# Patient Record
Sex: MALE
[De-identification: names, ages, dates, MRNs, and addresses within clinical notes are randomized per-mention and may not be internally consistent; named-entity substitution may affect disease eponyms.]

---

## 2022-07-28 ENCOUNTER — HOSPITAL ENCOUNTER (EMERGENCY)
Dept: HOSPITAL 5 - ED | Age: 37
LOS: 1 days | Discharge: HOME | End: 2022-07-29
Payer: SELF-PAY

## 2022-07-28 DIAGNOSIS — I10: ICD-10-CM
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
## 2022-07-28 DIAGNOSIS — S05.01XA: Primary | ICD-10-CM

## 2022-07-28 PROCEDURE — 99283 EMERGENCY DEPT VISIT LOW MDM: CPT

## 2022-07-28 NOTE — EMERGENCY DEPARTMENT REPORT
ED Eye Problem HPI





- General


Chief complaint: Eye Problems


Stated complaint: EYE


Time Seen by Provider: 07/28/22 22:16


Source: patient


Mode of arrival: Ambulatory


Limitations: No Limitations





- History of Present Illness


MD chief complaint: eye pain, eye redness, eye injury (Hit in the right eyebrow 

fragment of wood while working since the injury earlier today reports pain)


Eye Symptoms: redness, pain


Consistency: constant


Associated Symptoms: none


Treatments Prior to Arrival: irrigated eye





- Related Data


                                  Previous Rx's











 Medication  Instructions  Recorded  Last Taken  Type


 


Amlodipine Besylate [Norvasc] 5 mg PO DAILY #20 07/28/22 Unknown Rx


 


Ketorolac Tromethamin 0.4%(Nf) 1 drop OP QID #1 bottle 07/28/22 Unknown Rx





[Acular Ls 0.4% Ophth Sol]    


 


Tobramycin 0.3% [Tobrex] 1 drop OU Q8HR #1 bottle 07/28/22 Unknown Rx











                                    Allergies











Allergy/AdvReac Type Severity Reaction Status Date / Time


 


No Known Allergies Allergy   Unverified 07/28/22 22:14














ED Review of Systems


ROS: 


Stated complaint: EYE


Other details as noted in HPI





Comment: All other systems reviewed and negative





ED Past Medical Hx





- Medications


Home Medications: 


                                Home Medications











 Medication  Instructions  Recorded  Confirmed  Last Taken  Type


 


Amlodipine Besylate [Norvasc] 5 mg PO DAILY #20 07/28/22  Unknown Rx


 


Ketorolac Tromethamin 0.4%(Nf) 1 drop OP QID #1 bottle 07/28/22  Unknown Rx





[Acular Ls 0.4% Ophth Sol]     


 


Tobramycin 0.3% [Tobrex] 1 drop OU Q8HR #1 bottle 07/28/22  Unknown Rx














ED Physical Exam





- General


Limitations: No Limitations


General appearance: alert, in no apparent distress





- Head


Head exam: Present: atraumatic, normocephalic





- Eye


Eye exam: Present: normal appearance, PERRL, EOMI


Pupils: Present: normal accommodation





- Expanded Eye Exam


  ** Expanded


Eyelids: Normal Inspection: Right


Pupils: Regular, Round: Bilateral


Sclera/Conjunctival: Normal Inspection: Left, Injection: Right (Increased 

fluorescein uptake that area of the iris at 9:00 as well is a pterygiums noted 

at 3:00 no evidence of any retained foreign body no anterior eyelid trauma)


Anterior chamber: Normal Inspection: Bilateral


Posterior chamber: Normal Inspection: Bilateral





- ENT


ENT exam: Present: mucous membranes moist





- Neck


Neck exam: Present: normal inspection





- Respiratory


Respiratory exam: Present: normal lung sounds bilaterally.  Absent: respiratory 

distress





- Cardiovascular


Cardiovascular Exam: Present: regular rate, normal rhythm.  Absent: systolic 

murmur, diastolic murmur, rubs, gallop





- GI/Abdominal


GI/Abdominal exam: Present: soft, normal bowel sounds





- Rectal


Rectal exam: Present: deferred





- Extremities Exam


Extremities exam: Present: normal inspection





- Back Exam


Back exam: Present: normal inspection





- Neurological Exam


Neurological exam: Present: alert, oriented X3





- Psychiatric


Psychiatric exam: Present: normal affect, normal mood





- Skin


Skin exam: Present: warm, dry, intact, normal color.  Absent: rash





ED Course





                                   Vital Signs











  07/28/22





  22:08


 


Temperature 98.2 F


 


Pulse Rate 60


 


Respiratory 20





Rate 


 


Blood Pressure 190/121





[Right] 


 


O2 Sat by Pulse 98





Oximetry 














ED Medical Decision Making





- Medical Decision Making





Presents to the emergency department complaining of high blood pressure.


Patient is otherwise asymptomatic without confusion, chest pain, hematuria, or 

SOB.


BP today is ___blood pressure today was 160/100 at the time of discharge


Patient is not currently on medication


Doubt CV, AMI, heart failure, renal infarction or failure or other end organ 

damage.





Disposition:Discussed with patient their elevated blood pressure and need for 

close outpatient management of their hypertension. Will provide a prescription 

for the patients previous antihypertensive medication and arrange for the 

patient to follow up in a primary care clinic





Disposition: Discussed with patient their elevated blood pressure and need for 

close outpatient management of their hypertension. Will provide a prescription 

for amlodipine 5mg PO daily and arrange for the patient to follow up in a 

primary care clinic


Critical care attestation.: 


If time is entered above; I have spent that time in minutes in the direct care o

f this critically ill patient, excluding procedure time.








ED Disposition


Clinical Impression: 


 Corneal abrasion, right, HTN (hypertension)





Disposition: 01 HOME / SELF CARE / HOMELESS


Is pt being admited?: No


Does the pt Need Aspirin: No


Condition: Stable


Instructions:  Abrasion, Corneal Abrasion, Hypertension (ED), Hypertension, 

Adult


Prescriptions: 


Ketorolac Tromethamin 0.4%(Nf) [Acular Ls 0.4% Ophth Sol] 1 drop OP QID #1 

bottle


Amlodipine Besylate [Norvasc] 5 mg PO DAILY #20


Tobramycin 0.3% [Tobrex] 1 drop OU Q8HR #1 bottle


Referrals: 


ERWIN WU MD [Primary Care Provider] - 3-5 Days


[facility] [Provider Group] - 3-5 Days

## 2022-07-29 VITALS — DIASTOLIC BLOOD PRESSURE: 103 MMHG | SYSTOLIC BLOOD PRESSURE: 163 MMHG
